# Patient Record
Sex: FEMALE | Race: WHITE
[De-identification: names, ages, dates, MRNs, and addresses within clinical notes are randomized per-mention and may not be internally consistent; named-entity substitution may affect disease eponyms.]

---

## 2018-06-25 ENCOUNTER — HOSPITAL ENCOUNTER (EMERGENCY)
Dept: HOSPITAL 17 - NEPE | Age: 73
Discharge: HOME | End: 2018-06-25
Payer: SELF-PAY

## 2018-06-25 VITALS — BODY MASS INDEX: 18.75 KG/M2 | HEIGHT: 63 IN | WEIGHT: 105.82 LBS

## 2018-06-25 VITALS
SYSTOLIC BLOOD PRESSURE: 137 MMHG | DIASTOLIC BLOOD PRESSURE: 63 MMHG | OXYGEN SATURATION: 97 % | HEART RATE: 52 BPM | RESPIRATION RATE: 18 BRPM | TEMPERATURE: 97.8 F

## 2018-06-25 VITALS
RESPIRATION RATE: 18 BRPM | DIASTOLIC BLOOD PRESSURE: 63 MMHG | OXYGEN SATURATION: 100 % | HEART RATE: 86 BPM | SYSTOLIC BLOOD PRESSURE: 140 MMHG

## 2018-06-25 VITALS
DIASTOLIC BLOOD PRESSURE: 87 MMHG | HEART RATE: 67 BPM | RESPIRATION RATE: 16 BRPM | SYSTOLIC BLOOD PRESSURE: 196 MMHG | OXYGEN SATURATION: 96 %

## 2018-06-25 VITALS — OXYGEN SATURATION: 100 % | HEART RATE: 57 BPM | RESPIRATION RATE: 18 BRPM

## 2018-06-25 VITALS
SYSTOLIC BLOOD PRESSURE: 133 MMHG | HEART RATE: 78 BPM | OXYGEN SATURATION: 99 % | RESPIRATION RATE: 16 BRPM | DIASTOLIC BLOOD PRESSURE: 68 MMHG

## 2018-06-25 VITALS
DIASTOLIC BLOOD PRESSURE: 63 MMHG | RESPIRATION RATE: 16 BRPM | SYSTOLIC BLOOD PRESSURE: 137 MMHG | HEART RATE: 50 BPM | OXYGEN SATURATION: 100 % | TEMPERATURE: 97.8 F

## 2018-06-25 DIAGNOSIS — R42: ICD-10-CM

## 2018-06-25 DIAGNOSIS — R11.2: Primary | ICD-10-CM

## 2018-06-25 DIAGNOSIS — R00.1: ICD-10-CM

## 2018-06-25 DIAGNOSIS — K21.9: ICD-10-CM

## 2018-06-25 DIAGNOSIS — R06.02: ICD-10-CM

## 2018-06-25 DIAGNOSIS — R10.9: ICD-10-CM

## 2018-06-25 LAB
ALBUMIN SERPL-MCNC: 3.7 GM/DL (ref 3.4–5)
ALP SERPL-CCNC: 76 U/L (ref 45–117)
ALT SERPL-CCNC: 21 U/L (ref 10–53)
AMORPHOUS SEDIMENT, URINE: (no result)
AST SERPL-CCNC: 30 U/L (ref 15–37)
BACTERIA #/AREA URNS HPF: (no result) /HPF
BASOPHILS # BLD AUTO: 0.1 TH/MM3 (ref 0–0.2)
BASOPHILS NFR BLD: 0.5 % (ref 0–2)
BILIRUB SERPL-MCNC: 0.9 MG/DL (ref 0.2–1)
BUN SERPL-MCNC: 14 MG/DL (ref 7–18)
CALCIUM SERPL-MCNC: 9.2 MG/DL (ref 8.5–10.1)
CHLORIDE SERPL-SCNC: 110 MEQ/L (ref 98–107)
COLOR UR: YELLOW
CREAT SERPL-MCNC: 0.72 MG/DL (ref 0.5–1)
EOSINOPHIL # BLD: 0 TH/MM3 (ref 0–0.4)
EOSINOPHIL NFR BLD: 0 % (ref 0–4)
ERYTHROCYTE [DISTWIDTH] IN BLOOD BY AUTOMATED COUNT: 13.6 % (ref 11.6–17.2)
GFR SERPLBLD BASED ON 1.73 SQ M-ARVRAT: 79 ML/MIN (ref 89–?)
GLUCOSE SERPL-MCNC: 90 MG/DL (ref 74–106)
GLUCOSE UR STRIP-MCNC: (no result) MG/DL
HCO3 BLD-SCNC: 20.4 MEQ/L (ref 21–32)
HCT VFR BLD CALC: 39.7 % (ref 35–46)
HGB BLD-MCNC: 12.9 GM/DL (ref 11.6–15.3)
HGB UR QL STRIP: (no result)
INR PPP: 1 RATIO
KETONES UR STRIP-MCNC: (no result) MG/DL
LYMPHOCYTES # BLD AUTO: 1.4 TH/MM3 (ref 1–4.8)
LYMPHOCYTES NFR BLD AUTO: 10.3 % (ref 9–44)
MCH RBC QN AUTO: 29.9 PG (ref 27–34)
MCHC RBC AUTO-ENTMCNC: 32.6 % (ref 32–36)
MCV RBC AUTO: 91.8 FL (ref 80–100)
MONOCYTE #: 0.6 TH/MM3 (ref 0–0.9)
MONOCYTES NFR BLD: 4.6 % (ref 0–8)
MUCOUS THREADS #/AREA URNS LPF: (no result) /LPF
NEUTROPHILS # BLD AUTO: 11.2 TH/MM3 (ref 1.8–7.7)
NEUTROPHILS NFR BLD AUTO: 84.6 % (ref 16–70)
NITRITE UR QL STRIP: (no result)
PLATELET # BLD: 181 TH/MM3 (ref 150–450)
PMV BLD AUTO: 11.7 FL (ref 7–11)
PROT SERPL-MCNC: 7 GM/DL (ref 6.4–8.2)
PROTHROMBIN TIME: 10.5 SEC (ref 9.8–11.6)
RBC # BLD AUTO: 4.32 MIL/MM3 (ref 4–5.3)
SODIUM SERPL-SCNC: 143 MEQ/L (ref 136–145)
SP GR UR STRIP: 1.02 (ref 1–1.03)
TROPONIN I SERPL-MCNC: (no result) NG/ML (ref 0.02–0.05)
URINE LEUKOCYTE ESTERASE: (no result)
WBC # BLD AUTO: 13.2 TH/MM3 (ref 4–11)

## 2018-06-25 PROCEDURE — 76775 US EXAM ABDO BACK WALL LIM: CPT

## 2018-06-25 PROCEDURE — 71045 X-RAY EXAM CHEST 1 VIEW: CPT

## 2018-06-25 PROCEDURE — 80053 COMPREHEN METABOLIC PANEL: CPT

## 2018-06-25 PROCEDURE — 85610 PROTHROMBIN TIME: CPT

## 2018-06-25 PROCEDURE — 96374 THER/PROPH/DIAG INJ IV PUSH: CPT

## 2018-06-25 PROCEDURE — 81001 URINALYSIS AUTO W/SCOPE: CPT

## 2018-06-25 PROCEDURE — 82550 ASSAY OF CK (CPK): CPT

## 2018-06-25 PROCEDURE — 85025 COMPLETE CBC W/AUTO DIFF WBC: CPT

## 2018-06-25 PROCEDURE — 99285 EMERGENCY DEPT VISIT HI MDM: CPT

## 2018-06-25 PROCEDURE — 93005 ELECTROCARDIOGRAM TRACING: CPT

## 2018-06-25 PROCEDURE — 83880 ASSAY OF NATRIURETIC PEPTIDE: CPT

## 2018-06-25 PROCEDURE — 96361 HYDRATE IV INFUSION ADD-ON: CPT

## 2018-06-25 PROCEDURE — 85730 THROMBOPLASTIN TIME PARTIAL: CPT

## 2018-06-25 PROCEDURE — 83690 ASSAY OF LIPASE: CPT

## 2018-06-25 PROCEDURE — 83605 ASSAY OF LACTIC ACID: CPT

## 2018-06-25 PROCEDURE — 82552 ASSAY OF CPK IN BLOOD: CPT

## 2018-06-25 PROCEDURE — 74176 CT ABD & PELVIS W/O CONTRAST: CPT

## 2018-06-25 PROCEDURE — 84484 ASSAY OF TROPONIN QUANT: CPT

## 2018-06-25 NOTE — EKG
Date Performed: 06/25/2018       Time Performed: 18:29:55

 

PTAGE:      73 years

 

EKG:      SINUS BRADYCARDIA LOW QRS VOLTAGE IN PRECORDIAL LEADS BORDERLINE ECG 

 

NO PREVIOUS TRACING            

 

DOCTOR:   Isidoro Anne  Interpretating Date/Time  06/25/2018 19:25:52

## 2018-06-25 NOTE — PD
Physical Exam


Date Seen by Provider:  Jun 25, 2018


Time Seen by Provider:  20:02


Narrative


Patient is signed out to me at shift change patient is pending labs patient has 

a CAT scan pending as well she in the ER for excessive vomiting this a.m. 

however she has not required any antiemetics in the ER awaiting labs and I 

order 500 cc of fluid and Pepcid IV she had artery received a liter from prior 

attending





Data


Data


Last Documented VS





Vital Signs








  Date Time  Temp Pulse Resp B/P (MAP) Pulse Ox O2 Delivery O2 Flow Rate FiO2


 


6/25/18 22:05  78 16 133/68 (89) 99 Room Air  


 


6/25/18 17:13 97.8       








Orders





 Orders


Complete Blood Count With Diff (6/25/18 17:32)


Comprehensive Metabolic Panel (6/25/18 17:32)


Lipase (6/25/18 17:32)


Lactic Acid (6/25/18 17:32)


Prothrombin Time / Inr (Pt) (6/25/18 17:32)


Act Partial Throm Time (Ptt) (6/25/18 17:32)


Urinalysis - C+S If Indicated (6/25/18 17:32)


Iv Access Insert/Monitor (6/25/18 17:32)


Ecg Monitoring (6/25/18 17:32)


Oximetry (6/25/18 17:32)


NPO (6/25/18 17:32)


Sodium Chlor 0.9% 1000 Ml Inj (Ns 1000 M (6/25/18 17:32)


Electrocardiogram (6/25/18 17:32)


Chest, Single Ap (6/25/18 17:32)


B-Type Natriuretic Peptide (6/25/18 17:32)


Ckmb (Isoenzyme) Profile (6/25/18 17:32)


Troponin I (6/25/18 17:32)


Ct Abd/Pel W/O Iv Contrast (6/25/18 17:32)


Vascular Access Team Consult/P PRN (6/25/18 18:21)


Vascular Poc Ultrasound (6/25/18 )


Sodium Chlorid 0.9% 500 Ml Inj (Ns 500 M (6/25/18 19:45)


Famotidine Inj (Pepcid Inj) (6/25/18 19:45)


Us Kidney/Renal/Bladder (6/25/18 )


CKMB (6/25/18 19:13)


CKMB% (6/25/18 19:13)


Meclizine (Antivert) (6/25/18 23:45)





Labs





Laboratory Tests








Test


  6/25/18


17:45 6/25/18


19:13 6/25/18


20:41


 


Urine Color YELLOW   


 


Urine Turbidity CLOUDY   


 


Urine pH 8.0   


 


Urine Specific Gravity 1.017   


 


Urine Protein NEG mg/dL   


 


Urine Glucose (UA) NEG mg/dL   


 


Urine Ketones


  80 OR GREATER


mg/dL 


  


 


 


Urine Occult Blood NEG   


 


Urine Nitrite NEG   


 


Urine Bilirubin NEG   


 


Urine Urobilinogen 2.0 mg/dL   


 


Urine Leukocyte Esterase NEG   


 


Urine RBC 2 /hpf   


 


Urine WBC 2 /hpf   


 


Urine Amorphous Sediment MOD   


 


Urine Bacteria RARE /hpf   


 


Urine Mucus FEW /lpf   


 


Microscopic Urinalysis Comment


  CULT NOT


INDICATED 


  


 


 


White Blood Count  13.2 TH/MM3  


 


Red Blood Count  4.32 MIL/MM3  


 


Hemoglobin  12.9 GM/DL  


 


Hematocrit  39.7 %  


 


Mean Corpuscular Volume  91.8 FL  


 


Mean Corpuscular Hemoglobin  29.9 PG  


 


Mean Corpuscular Hemoglobin


Concent 


  32.6 % 


  


 


 


Red Cell Distribution Width  13.6 %  


 


Platelet Count  181 TH/MM3  


 


Mean Platelet Volume  11.7 FL  


 


Neutrophils (%) (Auto)  84.6 %  


 


Lymphocytes (%) (Auto)  10.3 %  


 


Monocytes (%) (Auto)  4.6 %  


 


Eosinophils (%) (Auto)  0.0 %  


 


Basophils (%) (Auto)  0.5 %  


 


Neutrophils # (Auto)  11.2 TH/MM3  


 


Lymphocytes # (Auto)  1.4 TH/MM3  


 


Monocytes # (Auto)  0.6 TH/MM3  


 


Eosinophils # (Auto)  0.0 TH/MM3  


 


Basophils # (Auto)  0.1 TH/MM3  


 


CBC Comment  DIFF FINAL  


 


Differential Comment    


 


Blood Urea Nitrogen  14 MG/DL  


 


Creatinine  0.72 MG/DL  


 


Random Glucose  90 MG/DL  


 


Total Protein  7.0 GM/DL  


 


Albumin  3.7 GM/DL  


 


Calcium Level  9.2 MG/DL  


 


Alkaline Phosphatase  76 U/L  


 


Aspartate Amino Transf


(AST/SGOT) 


  30 U/L 


  


 


 


Alanine Aminotransferase


(ALT/SGPT) 


  21 U/L 


  


 


 


Total Bilirubin  0.9 MG/DL  


 


Sodium Level  143 MEQ/L  


 


Potassium Level  4.2 MEQ/L  


 


Chloride Level  110 MEQ/L  


 


Carbon Dioxide Level  20.4 MEQ/L  


 


Anion Gap  13 MEQ/L  


 


Estimat Glomerular Filtration


Rate 


  79 ML/MIN 


  


 


 


Lactic Acid Level  2.0 mmol/L  


 


Total Creatine Kinase  102 U/L  


 


Creatine Kinase MB  1.6 NG/ML  


 


Troponin I


  


  LESS THAN 0.02


NG/ML 


 


 


B-Type Natriuretic Peptide  47 PG/ML  


 


Lipase  85 U/L  


 


Prothrombin Time   10.5 SEC 


 


Prothromb Time International


Ratio 


  


  1.0 RATIO 


 


 


Activated Partial


Thromboplast Time 


  


  19.2 SEC 


 











[place]


Medical Record Reviewed:  Yes


Supervised Visit with [facility]:  No


Narrative Course

















Patient reports feeling much better CT shows a structure in the kidney that is 

not specifically cystic and recommend an ultrasound to diagnose further I order 

a bedside ultrasound renal to decide what the mass inside the kidney could be 

otherwise patient feeling much better other labs are normal mild elevation WBC- 

13.2


Diagnosis





 Primary Impression:  


 Vomiting


 Qualified Codes:  R11.10 - Vomiting, unspecified


Patient Instructions:  Acute Nausea and Vomiting (ED), General Instructions, 

Vertigo (ED)


Scripts


Ondansetron Odt (Zofran Odt) 4 Mg Tab


4 MG SL Q6HR Y for Nausea/Vomiting, #20 TAB 0 Refills


   Prov: Edvin Waite MD         6/25/18 


Meclizine (Meclizine) 25 Mg Tab


25 MG PO TID Y for VERTIGO, #15 TAB 0 Refills


   Prov: Edvin Waite MD         6/25/18











Edvin Waite MD Jun 25, 2018 20:03

## 2018-06-25 NOTE — RADRPT
EXAM DATE:  6/25/2018 6:17 PM EDT

AGE/SEX:        73 years / Female



INDICATIONS:  Evaluate for free air



CLINICAL DATA:  This is the patient's initial encounter. Patient reports that signs and symptoms have
 been present for 1 day and indicates a pain score of 0/10. 

                                                                          

MEDICAL/SURGICAL HISTORY:       Gastroesophageal reflux disease. Hysterectomy.



COMPARISON:      No prior exams available for comparison. 





FINDINGS:  

Portable AP view of the chest demonstrates a normal-sized cardiac silhouette. No effusion, consolidat
ion, or pneumothorax is identified. The bones and soft tissues demonstrate no acute finding. EKG line
s overlie the patient. No free air is visualized.



CONCLUSION: 

No acute cardiopulmonary abnormality is identified. No free air is visualized in the upper abdomen.



Electronically signed by: Luther Carrasquillo MD  6/25/2018 6:32 PM EDT

## 2018-06-25 NOTE — PD
HPI


Chief Complaint:  GI Complaint


Time Seen by Provider:  17:21


Travel History


International Travel<30 days:  No


Contact w/Intl Traveler<30days:  No


Traveled to known affect area:  No





History of Present Illness


HPI


Patient visiting from [place] at a [employer] on yesterday (ate a 

hamburger), positive nausea vomiting dizziness this morning.  EMS her blood 

pressure was 90/60 heart rate was 40s and a evaluate her.  She states she has 

had 4-5 episodes of vomiting bile.  Accu-Chek was 107.  She has never had these 

symptoms before.  He denies fever, chills, diarrhea, chest pain, she does have 

some shortness of breath after vomiting she initially had some abdominal pain 

with vomiting.  She is passing flatus.





PFSH


Past Medical History


Anxiety:  Yes


Depression:  Yes


Dementia:  Yes


GERD:  Yes


Dilation and Curettage (D&C):  Yes





Past Surgical History


Hysterectomy:  Yes





Social History


Alcohol Use:  No


Tobacco Use:  No


Substance Use:  No





Allergies-Medications


(Allergen,Severity, Reaction):  


Coded Allergies:  


     No Known Allergies (Unverified , 6/25/18)


 PATIENT CANNOT RECALL WHAT MEDICATION SHE WAS GIVEN THAT


 SHE DEVELOPED AN ALLERGY TOO, BUT IT WAS GIVEN FOR ANXIETY


Reported Meds & Prescriptions





Reported Meds & Active Scripts


Active


Reported


Vitamin B-12 (Cyanocobalamin) 500 Mcg Tab 500 Mcg PO DAILY


Vitamin D-3 (Cholecalciferol) 1,000 Unit Cap 1,000 Units PO DAILY


Memantine 10 Mg Tab 10 Mg PO BID


Donepezil 10 Mg Tab 10 Mg PO HS


Omeprazole 20 Mg Tab 20 Mg PO DAILY


Duloxetine DR (Duloxetine HCl) 20 Mg Capdr 20 Mg PO DAILY


Buspirone (Buspirone HCl) 10 Mg Tab 5 Mg PO BID








Review of Systems


Except as stated in HPI:  all other systems reviewed are Neg





Physical Exam


Narrative


GENERAL: No acute distress.


SKIN: Focused skin assessment warm/dry.


HEAD: Atraumatic. Normocephalic. 


EYES: Pupils equal and round. No scleral icterus. No injection or drainage. 


ENT: No nasal bleeding or discharge.  Mucous membranes pink and moist.


NECK: Trachea midline. No JVD. 


CARDIOVASCULAR: Regular rate and rhythm.  No murmur appreciated.


RESPIRATORY: No accessory muscle use. Clear to auscultation. Breath sounds 

equal bilaterally. 


GASTROINTESTINAL: Abdomen soft, non-tender, nondistended. Hepatic and splenic 

margins not palpable. 


MUSCULOSKELETAL: No obvious deformities. No clubbing.  No cyanosis.  No edema. 


NEUROLOGICAL: Awake and alert. No obvious cranial nerve deficits.  Motor 

grossly within normal limits. Normal speech.


PSYCHIATRIC: Appropriate mood and affect; insight and judgment normal.





Data


Data


Last Documented VS





Vital Signs








  Date Time  Temp Pulse Resp B/P (MAP) Pulse Ox O2 Delivery O2 Flow Rate FiO2


 


6/25/18 17:50  57 18  100 Room Air  


 


6/25/18 17:13 97.8       








Orders





 Orders


Complete Blood Count With Diff (6/25/18 17:32)


Comprehensive Metabolic Panel (6/25/18 17:32)


Lipase (6/25/18 17:32)


Lactic Acid (6/25/18 17:32)


Prothrombin Time / Inr (Pt) (6/25/18 17:32)


Act Partial Throm Time (Ptt) (6/25/18 17:32)


Urinalysis - C+S If Indicated (6/25/18 17:32)


Iv Access Insert/Monitor (6/25/18 17:32)


Ecg Monitoring (6/25/18 17:32)


Oximetry (6/25/18 17:32)


NPO (6/25/18 17:32)


Sodium Chlor 0.9% 1000 Ml Inj (Ns 1000 M (6/25/18 17:32)


Electrocardiogram (6/25/18 17:32)


Chest, Single Ap (6/25/18 17:32)


B-Type Natriuretic Peptide (6/25/18 17:32)


Ckmb (Isoenzyme) Profile (6/25/18 17:32)


Troponin I (6/25/18 17:32)


Ct Abd/Pel W/O Iv Contrast (6/25/18 17:32)


Vascular Access Team Consult/P PRN (6/25/18 18:21)


Vascular Poc Ultrasound (6/25/18 )





Labs





Laboratory Tests








Test


  6/25/18


17:45


 


Urine Color YELLOW 


 


Urine Turbidity CLOUDY 


 


Urine pH 8.0 


 


Urine Specific Gravity 1.017 


 


Urine Protein NEG mg/dL 


 


Urine Glucose (UA) NEG mg/dL 


 


Urine Ketones


  80 OR GREATER


mg/dL


 


Urine Occult Blood NEG 


 


Urine Nitrite NEG 


 


Urine Bilirubin NEG 


 


Urine Urobilinogen 2.0 mg/dL 


 


Urine Leukocyte Esterase NEG 


 


Urine RBC 2 /hpf 


 


Urine WBC 2 /hpf 


 


Urine Amorphous Sediment MOD 


 


Urine Bacteria RARE /hpf 


 


Urine Mucus FEW /lpf 


 


Microscopic Urinalysis Comment


  CULT NOT


INDICATED











MDM


Medical Decision Making


Medical Screen Exam Complete:  Yes


Emergency Medical Condition:  Yes


Interpretation(s)


EKG: Sinus bradycardia, rate 51








Last Impressions








Chest X-Ray 6/25/18 2672 Signed





Impressions: 





 CONCLUSION: 





 No acute cardiopulmonary abnormality is identified. No free air is visualized i





 n the upper abdomen.





  





 








Differential Diagnosis


ACS, ulcer disease, GERD/gastritis, gastroenteritis


Narrative Course


Patient presents to the emergency department complaint of nausea vomiting.  

Placed on cardiac monitor, IV access obtained, and labs/CT/EKG/chest x-ray and 

IV fluids ordered. She does not want any med for n/v. 





Patient will be signed out to oncoming physician for labs and CT results and 

final dispo.











Sheryl Walton MD Jun 25, 2018 17:37

## 2018-06-25 NOTE — RADRPT
EXAM DATE:  6/25/2018 7:39 PM EDT

AGE/SEX:        73 years / Female



INDICATIONS:  Vomiting



CLINICAL DATA:  This is the patient's initial encounter. Patient reports that signs and symptoms have
 been present for 1 day and indicates a pain score of 0/10. 

                                                                          

MEDICAL/SURGICAL HISTORY:       . DEMENTIA  Hysterectomy.



RADIATION DOSE:  4.61 CTDI (mGy)









COMPARISON:      No prior exams available for comparison. 





TECHNIQUE:  Multiple contiguous axial images were obtained through the abdomen. Images were obtained 
using multiple row detector helical technique. Using automated exposure control and adjustment of the
 mA and/or kV according to patient size, radiation dose was kept as low as reasonably achievable to o
btain optimal diagnostic quality images.  DICOM format image data is available electronically for rev
iew and comparison. 



FINDINGS: Examination quality is degraded by motion artifact.

Lower chest: No acute abnormality is identified.

Hepatobiliary: There are at least 7 low density lesions in the liver measuring up to 2.3 cm. The lesi
ons greater than 1 cm in size and density measurements consistent with cysts while the smaller lesion
s are too small to characterize. No calcified gallstones are present.

Kidneys: There is mild bilateral hydronephrosis and hydroureter. No renal stones are present. There i
s an exophytic low-density lesion at the lower pole the right kidney with Hounsfield measurements of 
25. This lesion measures 3.9 cm.

Adrenal Glands: Within normal limits.

Spleen: Within normal limits.

Pancreas: Within normal limits.

Vascular: The aorta is nonaneurysmal. There is mild atherosclerotic disease.

Bowel/Mesentery: The stomach and small bowel demonstrate no abnormality. No acute colon abnormality i
s seen. There is no free intraperitoneal air or fluid.

Abdominal Wall: No hernia is visualized.

Retroperitoneum: No lymphadenopathy.

Bladder: No wall thickening or mass. Urinary bladder is distended.

Reproductive: Uterus is absent. No adnexal abnormality is identified.

Inguinal:  No lymphadenopathy or hernia. 

Musculoskeletal: There are degenerative changes of the spine but no acute osseous abnormality is seen
.





CONCLUSION:

1.  No specific abnormality is identified to explain the vomiting. Examination is mildly degraded by 
motion artifact.

2.  There is moderate bilateral hydronephrosis and hydroureter with distended urinary bladder. No obs
tructing process is seen. The mild hydronephrosis could be related to the urinary bladder distention 
or reflux.

3.  There is a 3.9 cm lesion at the lower pole the right kidney which does not meet criteria for a si
mple cyst. This could represent a complex cyst or solid mass. Recommend further characterization on a
n elective basis with renal ultrasound. Also correlate with any prior imaging studies.



Electronically signed by: Luther Carrasquillo MD  6/25/2018 8:32 PM EDT